# Patient Record
Sex: MALE | Race: WHITE | Employment: OTHER | URBAN - METROPOLITAN AREA
[De-identification: names, ages, dates, MRNs, and addresses within clinical notes are randomized per-mention and may not be internally consistent; named-entity substitution may affect disease eponyms.]

---

## 2017-02-21 PROBLEM — Z95.5 S/P CORONARY ARTERY STENT PLACEMENT: Status: ACTIVE | Noted: 2017-02-21

## 2017-03-21 PROBLEM — L60.0 INGROWN NAIL OF GREAT TOE OF LEFT FOOT: Status: ACTIVE | Noted: 2017-03-21

## 2017-09-22 PROBLEM — C61 PROSTATE CANCER (HCC): Status: ACTIVE | Noted: 2017-09-22

## 2017-09-22 PROBLEM — L60.0 INGROWN NAIL OF GREAT TOE OF LEFT FOOT: Status: RESOLVED | Noted: 2017-03-21 | Resolved: 2017-09-22

## 2018-01-09 PROCEDURE — 87086 URINE CULTURE/COLONY COUNT: CPT | Performed by: UROLOGY

## 2018-01-18 PROCEDURE — 88344 IMHCHEM/IMCYTCHM EA MLT ANTB: CPT | Performed by: UROLOGY

## 2018-01-18 PROCEDURE — 88305 TISSUE EXAM BY PATHOLOGIST: CPT | Performed by: UROLOGY

## 2018-08-20 PROCEDURE — 81015 MICROSCOPIC EXAM OF URINE: CPT | Performed by: UROLOGY

## 2018-12-04 PROCEDURE — 81015 MICROSCOPIC EXAM OF URINE: CPT | Performed by: UROLOGY

## 2019-05-03 PROBLEM — I10 ESSENTIAL HYPERTENSION: Status: ACTIVE | Noted: 2019-05-03

## 2019-08-13 PROCEDURE — 81015 MICROSCOPIC EXAM OF URINE: CPT | Performed by: UROLOGY

## 2019-08-13 PROCEDURE — 88108 CYTOPATH CONCENTRATE TECH: CPT | Performed by: UROLOGY

## 2020-10-23 PROBLEM — Z77.098 AGENT ORANGE EXPOSURE: Status: ACTIVE | Noted: 2020-10-23

## 2021-04-11 DIAGNOSIS — Z23 NEED FOR VACCINATION: ICD-10-CM

## 2024-03-29 ENCOUNTER — TELEPHONE (OUTPATIENT)
Dept: INTERNAL MEDICINE CLINIC | Age: 79
End: 2024-03-29

## 2024-03-29 DIAGNOSIS — S22.080S CLOSED WEDGE COMPRESSION FRACTURE OF T11 VERTEBRA, SEQUELA: Primary | ICD-10-CM

## 2024-03-29 RX ORDER — HYDROMORPHONE HYDROCHLORIDE 4 MG/1
1 TABLET ORAL
COMMUNITY
Start: 2024-03-28 | End: 2024-03-29

## 2024-03-29 RX ORDER — FENTANYL 12.5 UG/1
1 PATCH TRANSDERMAL
COMMUNITY
Start: 2024-03-29

## 2024-03-29 RX ORDER — HYDROMORPHONE HYDROCHLORIDE 4 MG/1
4 TABLET ORAL
Qty: 56 TABLET | Refills: 0 | Status: SHIPPED | OUTPATIENT
Start: 2024-03-29 | End: 2024-04-01

## 2024-03-29 NOTE — TELEPHONE ENCOUNTER
Patient is a new admission at the [facility]. Needs script for his hydromorphone tabs.     He has T11 fracture and low back pain along with MM.    Hospital chart reviewed and  reviewed.  MD saw patient today.    Script sent to [facility] for use at [facility].    Jacqui LÓPEZ

## 2024-04-01 ENCOUNTER — INITIAL APN SNF VISIT (OUTPATIENT)
Dept: INTERNAL MEDICINE CLINIC | Age: 79
End: 2024-04-01

## 2024-04-01 VITALS
DIASTOLIC BLOOD PRESSURE: 54 MMHG | BODY MASS INDEX: 27 KG/M2 | TEMPERATURE: 98 F | RESPIRATION RATE: 17 BRPM | SYSTOLIC BLOOD PRESSURE: 95 MMHG | OXYGEN SATURATION: 92 % | WEIGHT: 182.19 LBS | HEART RATE: 68 BPM

## 2024-04-01 DIAGNOSIS — S22.080S CLOSED WEDGE COMPRESSION FRACTURE OF T11 VERTEBRA, SEQUELA: ICD-10-CM

## 2024-04-01 RX ORDER — SENNOSIDES 8.6 MG
17.2 TABLET ORAL EVERY EVENING
COMMUNITY

## 2024-04-01 RX ORDER — ONDANSETRON 4 MG/1
4 TABLET, ORALLY DISINTEGRATING ORAL EVERY 8 HOURS PRN
COMMUNITY

## 2024-04-01 RX ORDER — LACTULOSE 10 G/15ML
10 SOLUTION ORAL 2 TIMES DAILY PRN
COMMUNITY

## 2024-04-01 RX ORDER — GABAPENTIN 100 MG/1
200 CAPSULE ORAL 3 TIMES DAILY
COMMUNITY

## 2024-04-01 RX ORDER — LIDOCAINE 50 MG/G
1 PATCH TOPICAL EVERY 24 HOURS
COMMUNITY

## 2024-04-01 RX ORDER — DOCUSATE SODIUM 100 MG/1
100 CAPSULE, LIQUID FILLED ORAL 2 TIMES DAILY
COMMUNITY

## 2024-04-01 RX ORDER — TIZANIDINE 4 MG/1
4 TABLET ORAL EVERY 8 HOURS PRN
COMMUNITY

## 2024-04-01 RX ORDER — HYDROMORPHONE HYDROCHLORIDE 4 MG/1
4 TABLET ORAL
Qty: 56 TABLET | Refills: 0 | Status: SHIPPED | OUTPATIENT
Start: 2024-04-01 | End: 2024-04-08

## 2024-04-01 NOTE — PROGRESS NOTES
Skilled Nursing Facility, Subacute Rehab  [facility]    Mann Brown Author: Jacqui Newsome, APRN     1945 MRN [id number]   Last Hospital  Admission        Last Hospital Discharge   PCP Billy Atkins MD       HPI:      Mann Brown is a 78 year old male with previous medical history including CAD, HTN, HLD, recent history of thoracic compression fracture T11 and kyphoplasty  3/11/24. He was admitted to the hospital for increasing back pain and found to have an acute T12 compression fracture on MRI. He had another kyphoplasty on 3/21/24. Pain management with IR and palliative care to follow. Fentanyl patch, dilaudid prn, gabapentin TID. Decadron burst completed 3/29/24. Had bone marrow biopsy pathology consistent with multiple myeloma, will need outpatient PET/CT and follow up with Dr Garcia. Now admitted to SNF for sub-acute rehabilitation.     Abdelrahman is seen in bed in his room today. His wife is at the bedside. He is having some improvement in pain post the second kyphoplasty. Pain is more when sitting upright, improves with lying down. He is sleeping well at night with gabapentin and dilaudid at night time. Then when he wakes in the morning he has a lot of pain when he gets out of bed. DW him, we can schedule a dose for 6 am and then he can get up after 7 with improved pain management. He is in agreement. He has been constipated. NO BM in several days. On senna at evening, add colace BID and lactulose Prn, suppository in the hospital with no results.   DW nursing. Med list to patient and family. He is taking aspirin 81 mg daily.     Hospital Discharge Diagnoses:  Acute T 12 compression fx with kyphoplasty  Recent T11 compression fx with kyphoplasty  Multiple Myeloma by bone marrow bx, new dx  CAD  HTN  HLD  Weakness  Pain  constipation      Chief Complaint at visit:   Chief Complaint   Patient presents with    Follow - Up     Low back pain, s/p kyphoplasty x2, bone marrow biopsy,  constipation        ALLERGIES    ALLERGIES:  Allergies   Allergen Reactions    Adhesive Tape RASH     blisters    Seasonal Runny nose       CURRENT MEDS:      CURRENT MEDICATIONS   Current Outpatient Medications   Medication Sig Dispense Refill    HYDROmorphone 4 MG Oral Tab Take 1 tablet (4 mg total) by mouth every 3 (three) hours as needed for Pain. And scheduled at 6am daily 56 tablet 0    gabapentin 100 MG Oral Cap Take 2 capsules (200 mg total) by mouth 3 (three) times daily.      lidocaine 5 % External Patch Place 1 patch onto the skin daily. On 12 hours off 12 hours      ondansetron 4 MG Oral Tablet Dispersible Take 1 tablet (4 mg total) by mouth every 8 (eight) hours as needed for Nausea.      sennosides 8.6 MG Oral Tab Take 2 tablets (17.2 mg total) by mouth every evening.      tiZANidine 4 MG Oral Tab Take 1 tablet (4 mg total) by mouth every 8 (eight) hours as needed (muscle spasm).      docusate sodium 100 MG Oral Cap Take 1 capsule (100 mg total) by mouth 2 (two) times daily.      lactulose 10 GM/15ML Oral Solution Take 15 mL (10 g total) by mouth 2 (two) times daily as needed (constipation).      fentaNYL 12 MCG/HR Transdermal Patch 72 Hr Place 1 patch onto the skin every 3 (three) days.      enalapril 10 MG Oral Tab Take 1 tablet (10 mg total) by mouth 2 (two) times daily. Patient needs appointment with PCP for future refills 180 tablet 1    ATORVASTATIN 10 MG Oral Tab TAKE 1 TABLET (10 MG TOTAL) BY MOUTH NIGHTLY. 90 tablet 3    latanoprost (XALATAN) 0.005 % Ophthalmic Solution 1 drop nightly.      CLARITIN 10 MG OR CAPS 1 CAPSULE DAILY during allergy season      ASPIRIN 81 MG OR TABS 1 TABLET DAILY      AMOXICILLIN 500 MG Oral Cap TAKE 4 CAPSULES 1 HOUR PRIOR TO DENTAL PROCEDURE (Patient not taking: Reported on 4/1/2024) 4 capsule 0    mupirocin 2 % External Ointment Apply 1 Application topically 2 (two) times daily. (Patient not taking: Reported on 4/1/2024) 30 g 2       HISTORY:    Past Medical  History:   Diagnosis Date    CORONARY ARTERY DISEASE     Coronary atherosclerosis of unspecified type of vessel, native or graft     Elevated prostate specific antigen (PSA)     Hx of diseases NEC     rashes, sensitive skin    HYPERLIPIDEMIA     HYPERTENSION     MARCELO (obstructive sleep apnea) DX 3/3/10; TX 4/10/10    AHI 25; SUPINE AHI 81; Erick 81%; CPAP=10 CWP;     Other and unspecified hyperlipidemia     Prostate cancer (HCC)     SEASONAL ALLERGIES     Spring & Summer    SLEEP APNEA 03/2010 DMG    AHI 25, desats 81%    Unspecified essential hypertension      Past Surgical History:   Procedure Laterality Date    CHOLECYSTECTOMY      COLONOSCOPY  2003    adenomatous colon polyp    COLONOSCOPY  2/2011    diverticulosis    CORONARY ART DIL,ONE VESSEL  2007    stent    OTHER SURGICAL HISTORY  01/18/2018    PNBx- Dr. Dunn    REMOVAL GALLBLADDER      SKIN SURGERY  02/03/2022    BCC sm -right medial canthus     VALVE REPAIR  10/09    Aortic valve replacement     Family History   Problem Relation Age of Onset    Arthritis Mother     Other (Other) Mother         alzheimer's     Cancer Father         Prostate cancer     Social History     Socioeconomic History    Marital status:    Occupational History    Occupation: -   Tobacco Use    Smoking status: Never    Smokeless tobacco: Never   Substance and Sexual Activity    Alcohol use: Yes     Comment: socially    Drug use: No   Other Topics Concern    Exercise Yes     Comment: walks 2 miles x 5 days, 3 days at [employer]         POA not on file    CODE STATUS:  Full Code    ADVANCED CARE PLANNING TEAM: None    SUBJECTIVE/ ROS:       REVIEW OF SYSTEMS:  GENERAL HEALTH:feels well otherwise  SKIN: denies any unusual skin lesions or rashes  WOUNDS: none   EYES:no visual complaints or deficits  HENT: denies nasal congestion, sinus pain or sore throat; and hearing loss negative  RESPIRATORY: denies shortness of breath, wheezing or cough    CARDIOVASCULAR:denies chest pain, no palpitations , denies cough  GI: denies nausea, vomiting,  diarrhea; no rectal bleeding; no heartburn + constipation, no abdominal pain  Gu: No urinary frequency, urgency or dysuria  MUSCULOSKELETAL:mid back pain  NEURO:no sensory or motor complaint  PSYCHE: no symptoms of depression or anxiety  HEMATOLOGY:denies bruising, denies excessive bleeding  ENDOCRINE: denies excessive thirst or urination; denies unexpected wt gain or wt loss  ALLERGY/IMM.: hx of allergies      OBJECTIVE:     ASSESSMENT/ PHYSICAL EXAM:     VITALS:  BP 95/54   Pulse 68   Temp 97.9 °F (36.6 °C)   Resp 17   Wt 182 lb 3.2 oz (82.6 kg)   SpO2 92%   BMI 26.91 kg/m²      PHYSICAL EXAM:  GENERAL HEALTH: well developed, well nourished, in no apparent distress  LINES, TUBES, DRAINS:  none  SKIN: pink, warm, dry  WOUND: none  EYES: sclera anicteric, conjunctiva normal; there is no nystagmus, no drainage from eyes  HENT: normocephalic; normal nose, no nasal drainage, mucous membranes pink, moist.  NECK: supple, non tender, FROM  BREAST: deferred  RESPIRATORY:clear, no crackles, no wheezing, no dyspnea, no cough, room air  CARDIOVASCULAR: RRR, S1 and S2, no murmurs, no edema  ABDOMEN:  normal active BS+, soft, nondistended; no organomegaly, no masses; nontender, no guarding, no rebound tenderness.  :no suprapubic distension  LYMPHATIC:no lymphedema  MUSCULOSKELETAL: no acute synovitis upper or lower extremity  EXTREMITIES/VASCULAR:radial pulses 2+ and dorsalis pedal pulses 2+  NEUROLOGIC: A&OX3, no focal deficits, follows commands, moves all extremities  PSYCHIATRIC: calm, cooperative, mood and affect appropriate to situation    Hospital and rehab lab results and imaging reviewed as available.    DIAGNOSTICS REVIEWED AT THIS VISIT:    Lab Results   Component Value Date    WBC 6.1 03/29/2024    RBC 3.76 (L) 03/29/2024    HGB 11.2 (L) 03/29/2024    HCT 32.9 (L) 03/29/2024    MCV 87.5 03/29/2024    MCH 29.8  03/29/2024    MCHC 34.0 03/29/2024    RDW 13.2 03/29/2024    .0 03/29/2024     Lab Results   Component Value Date    GLU 90 03/29/2024    BUN 31 (H) 03/29/2024    BUNCREA 23.0 (H) 09/09/2021    CREATSERUM 0.96 03/29/2024    ANIONGAP 5 03/29/2024    GFR >59 12/30/2010    CA 9.6 03/29/2024    OSMOCALC 292 03/29/2024    ALKPHO 86 03/29/2024    AST 17 03/29/2024    ALT 22 03/29/2024    BILT 0.4 03/29/2024    TP 5.5 (L) 03/29/2024    ALB 3.0 (L) 03/29/2024    GLOBULIN 2.5 (L) 03/29/2024    AGRATIO 2.5 10/10/2014     03/29/2024    K 4.8 03/29/2024     03/29/2024    CO2 27.0 03/29/2024     Lab Results   Component Value Date    MG 2.1 03/29/2024     Lab Results   Component Value Date    VITD 33.2 03/29/2024       [facility] medical records reviewed.  Medication reconciliation completed.        MEDICAL DECISION MAKING and PLAN OF CARE:       SEE PLAN BELOW    Back pain 2/2 Acute T12 compression fx s/p kyphoplasty   Recent T11 compression fracture s/p kyphoplasty 3/11/24  Concern for MM BMB +  Pain management with IR and palliative care consults inpt  Gabapenin 200 mg TID  Fentanyl patch 12 mcg Q72 hours, changed 3/29/24  Hydromorphone 4 mg Tab Q3 prn and scheduled in am at 6     Multiple Myeloma  Bone marrow biopsy pathology + MM  Needs outpatient PET and CT and follow up with Dr Garcia    CAD, HTN, HLD  VS q shift and prn  Aspirin 81 mg daily  Atorvastatin   Enalapril  Atorvastatin      Physical Deconditioning/Weakness; at risk for falling  Fall Precautions  [facility] team to establish care plan meeting with patient and POA/family as appropriate  KARUNA team/ & discharge planner to assist with establishing safe discharge plan for next level of care  PT/OT evaluate and treat  DC planning with MDT, SW, therapies  Services on DC: HHC RN/PT/OT/SW  Equipment on DC: Walker      Pain management  Monitor and assess pain  Gabapentin 200 mg TID  Fentanyl patch 12 mcg q 72 hours  Hydromorphone 4 mg Q3   prn  Offer to pre-medicate 30-60 min prior to therapy  Physiatry evaluation with management appreciated    Bowel management  Monitor for Bms  Constipated now  Adding colace 100 mg BID  On Senna 2 tabs qpm  Add lactulose 15 ml BID prn    Vitamins/supplements as r/t deficiencies  [facility] RD to follow while in rehab; supplementation/diet as per [facility] RD  May continue home supplements    At risk for malnutrition  Dietician consult  Weekly weights  Supplements as indicated  Encourage po intake    DVT Prophylaxis   Encourage exercise and participation with therapy as much as able      GI prophylaxis  none    Labs  CBC, CMP weekly and prn      Follow Ups:  PCP within 7 days of DC from rehab  Oncology Dr Garcia [phone]  Palliative care outpatient  [phone]  IR kyphoplasty follow up as needed        spent w/ patient and reviewing medical records, labs, completing medication reconciliation and entering orders to establish plan of care in [facility].      Note to patient: The 21st Century Cures Act makes medical notes like these available to patients in the interest of transparency. However, this is a medical document intended as peer to peer communication. It is written in medical language and may contain abbreviations or verbiage that are unfamiliar. It may appear blunt or direct. Medical documents are intended to carry relevant information, facts as evident, and the clinical opinion of the practitioner who signs the document.       Jacqui Newsome, APRN  04/01/24

## 2024-04-03 ENCOUNTER — SNF VISIT (OUTPATIENT)
Dept: INTERNAL MEDICINE CLINIC | Age: 79
End: 2024-04-03

## 2024-04-03 VITALS
BODY MASS INDEX: 27 KG/M2 | WEIGHT: 182 LBS | SYSTOLIC BLOOD PRESSURE: 118 MMHG | OXYGEN SATURATION: 95 % | DIASTOLIC BLOOD PRESSURE: 59 MMHG | HEART RATE: 63 BPM | TEMPERATURE: 98 F | RESPIRATION RATE: 16 BRPM

## 2024-04-03 DIAGNOSIS — R52 PAIN: ICD-10-CM

## 2024-04-03 DIAGNOSIS — K59.03 DRUG-INDUCED CONSTIPATION: ICD-10-CM

## 2024-04-03 DIAGNOSIS — I10 ESSENTIAL HYPERTENSION: ICD-10-CM

## 2024-04-03 DIAGNOSIS — S22.080S CLOSED WEDGE COMPRESSION FRACTURE OF T11 VERTEBRA, SEQUELA: Primary | ICD-10-CM

## 2024-04-03 DIAGNOSIS — C90.00 MULTIPLE MYELOMA NOT HAVING ACHIEVED REMISSION (HCC): ICD-10-CM

## 2024-04-03 PROCEDURE — 99309 SBSQ NF CARE MODERATE MDM 30: CPT | Performed by: NURSE PRACTITIONER

## 2024-04-03 RX ORDER — FENTANYL 25 UG/1
1 PATCH TRANSDERMAL
Qty: 10 PATCH | Refills: 0 | Status: SHIPPED | OUTPATIENT
Start: 2024-04-03 | End: 2024-05-03

## 2024-04-03 NOTE — PROGRESS NOTES
Mann Brown, 8/5/1945, 78 year old, male    Chief Complaint:    Chief Complaint   Patient presents with    Follow - Up     T11 and T12 compression fx s/p kyphoplasties. Multiple myeloma, pain, weakness        Subjective:   TODAY:  Abdelrahman is seen lying in bed this afternoon. Pain is improving a little. He is trying to take less dilaudid because of the constipation. Had small BM 3/31/24. He has an OK appetite. Feels the Dilaudid in the morning is helping. Still with increased pain at sitting and standing/walking.   Pain management gabapentin 200 mg TID  Fentanyl patch 12 mcg q 72 hours  Dilaudid 4 mg Q3 prn taking 5 per day, scheduled at 6 AM  Tizanidine 4 mg PO Q8 prn has take about three this week    DW him may increase fentanyl patch to 25 mcg daily with next change in attempt to improve baseline pain control and reduce ups and downs of the oral meds. He is in agreement    Bowel management  Colace 100 mg BID  Senna 2 tabs at PM  Lactulose 15 ml BID prn        Denies insomnia, fatigue, fever/chills, cough, SOB, dyspnea, angina, palpitations, n/v, diarrhea, ++constipation, and urinary sxs.      Objective:  /59   Pulse 63   Temp 97.7 °F (36.5 °C)   Resp 16   Wt 182 lb (82.6 kg)   SpO2 95%   BMI 26.88 kg/m²     PHYSICAL EXAM:  GENERAL HEALTH: well developed, well nourished, in no apparent distress  LINES, TUBES, DRAINS:  none  SKIN: pink, warm, dry  WOUND: none  EYES: sclera anicteric, conjunctiva normal; there is no nystagmus, no drainage from eyes  HENT: normocephalic; normal nose, no nasal drainage, mucous membranes pink, moist.  NECK: supple, non tender, FROM  BREAST: deferred  RESPIRATORY:clear, no crackles, no wheezing, no dyspnea, no cough, room air  CARDIOVASCULAR: RRR, S1 and S2, no murmurs, no edema  ABDOMEN:  normal active BS+, soft, nondistended; no organomegaly, no masses; nontender, no guarding, no rebound tenderness.  :no suprapubic distension  LYMPHATIC:no lymphedema  MUSCULOSKELETAL: no  acute synovitis upper or lower extremity  EXTREMITIES/VASCULAR:radial pulses 2+ and dorsalis pedal pulses 2+  NEUROLOGIC: A&OX3, no focal deficits, follows commands, moves all extremities  PSYCHIATRIC: calm, cooperative, mood and affect appropriate to situation    Therapy update:  Transfers: CGA  ADLS:  Mod assist dressing  Ambulation:  250 ft with RW and CGA  DC plan: home with spouse around 4/13/24    Medications reviewed: Yes      Current Outpatient Medications:     fentaNYL 25 MCG/HR Transdermal Patch 72 Hr, Place 1 patch onto the skin every third day., Disp: 10 patch, Rfl: 0    HYDROmorphone 4 MG Oral Tab, Take 1 tablet (4 mg total) by mouth every 3 (three) hours as needed for Pain. And scheduled at 6am daily, Disp: 56 tablet, Rfl: 0    gabapentin 100 MG Oral Cap, Take 2 capsules (200 mg total) by mouth 3 (three) times daily., Disp: , Rfl:     lidocaine 5 % External Patch, Place 1 patch onto the skin daily. On 12 hours off 12 hours, Disp: , Rfl:     ondansetron 4 MG Oral Tablet Dispersible, Take 1 tablet (4 mg total) by mouth every 8 (eight) hours as needed for Nausea., Disp: , Rfl:     sennosides 8.6 MG Oral Tab, Take 2 tablets (17.2 mg total) by mouth every evening., Disp: , Rfl:     tiZANidine 4 MG Oral Tab, Take 1 tablet (4 mg total) by mouth every 8 (eight) hours as needed (muscle spasm)., Disp: , Rfl:     docusate sodium 100 MG Oral Cap, Take 1 capsule (100 mg total) by mouth 2 (two) times daily., Disp: , Rfl:     lactulose 10 GM/15ML Oral Solution, Take 15 mL (10 g total) by mouth 2 (two) times daily as needed (constipation)., Disp: , Rfl:     AMOXICILLIN 500 MG Oral Cap, TAKE 4 CAPSULES 1 HOUR PRIOR TO DENTAL PROCEDURE (Patient not taking: Reported on 4/1/2024), Disp: 4 capsule, Rfl: 0    mupirocin 2 % External Ointment, Apply 1 Application topically 2 (two) times daily. (Patient not taking: Reported on 4/1/2024), Disp: 30 g, Rfl: 2    enalapril 10 MG Oral Tab, Take 1 tablet (10 mg total) by mouth 2  (two) times daily. Patient needs appointment with PCP for future refills, Disp: 180 tablet, Rfl: 1    ATORVASTATIN 10 MG Oral Tab, TAKE 1 TABLET (10 MG TOTAL) BY MOUTH NIGHTLY., Disp: 90 tablet, Rfl: 3    latanoprost (XALATAN) 0.005 % Ophthalmic Solution, 1 drop nightly., Disp: , Rfl:     CLARITIN 10 MG OR CAPS, 1 CAPSULE DAILY during allergy season, Disp: , Rfl:     ASPIRIN 81 MG OR TABS, 1 TABLET DAILY, Disp: , Rfl:       Diagnostics reviewed:    Lab Results   Component Value Date    WBC 6.1 03/29/2024    RBC 3.76 (L) 03/29/2024    HGB 11.2 (L) 03/29/2024    HCT 32.9 (L) 03/29/2024    MCV 87.5 03/29/2024    MCH 29.8 03/29/2024    MCHC 34.0 03/29/2024    RDW 13.2 03/29/2024    .0 03/29/2024     Lab Results   Component Value Date    GLU 90 03/29/2024    BUN 31 (H) 03/29/2024    BUNCREA 23.0 (H) 09/09/2021    CREATSERUM 0.96 03/29/2024    ANIONGAP 5 03/29/2024    GFR >59 12/30/2010    CA 9.6 03/29/2024    OSMOCALC 292 03/29/2024    ALKPHO 86 03/29/2024    AST 17 03/29/2024    ALT 22 03/29/2024    BILT 0.4 03/29/2024    TP 5.5 (L) 03/29/2024    ALB 3.0 (L) 03/29/2024    GLOBULIN 2.5 (L) 03/29/2024    AGRATIO 2.5 10/10/2014     03/29/2024    K 4.8 03/29/2024     03/29/2024    CO2 27.0 03/29/2024       Assessment and plan:    Back pain 2/2 Acute T12 compression fx s/p kyphoplasty   Recent T11 compression fracture s/p kyphoplasty 3/11/24  Concern for MM BMB +  Pain management with IR and palliative care consults inpt  Gabapenin 200 mg TID  Fentanyl patch 12 mcg Q72 hours, changed 3/29/24, increase with tomorrows change to 25 mcg q 72 hours  Hydromorphone 4 mg Tab Q3 prn and scheduled in am at 6   Tizanidine 4mg PO Q8 prn     Multiple Myeloma  Bone marrow biopsy pathology + MM  Needs outpatient PET and CT and follow up with Dr Garcia     CAD, HTN, HLD  VS q shift and prn  Aspirin 81 mg daily  Atorvastatin  10 mg nightly  Enalapril 10 mg BID      Physical Deconditioning/Weakness; at risk for falling  Fall  Precautions  K[facility]UNA team to establish care plan meeting with patient and POA/family as appropriate  KARUNA team/ & discharge planner to assist with establishing safe discharge plan for next level of care  PT/OT evaluate and treat  DC planning with MDT, SW, therapies around 4/13/24  Services on DC: C RN/PT/OT/SW  Equipment on DC: Walker        Pain management  Monitor and assess pain  Gabapentin 200 mg TID  Fentanyl patch 12 mcg increase to 25 mcg q 72 hours  Hydromorphone 4 mg Q3  prn  Offer to pre-medicate 30-60 min prior to therapy  Physiatry evaluation with management appreciated     Bowel management  Monitor for Bms  Constipated now bowel sounds present and non tender abdomen, passing flatus  Colace 100 mg BID  On Senna 2 tabs qpm change to BID  Lactulose 15 ml BID prn  Dulcolax supp daily prn if lactulose not effective     Vitamins/supplements as r/t deficiencies  KARU[facility] RD to follow while in rehab; supplementation/diet as per [facility] RD  May continue home supplements     At risk for malnutrition  Dietician consult  Weekly weights  Supplements as indicated  Encourage po intake     DVT Prophylaxis   Encourage exercise and participation with therapy as much as able     GI prophylaxis  none     Labs  CBC, CMP weekly and prn  Next 4/4/24    Follow Ups:  PCP within 7 days of DC from rehab  Oncology Dr Garcia [phone]  Palliative care outpatient  [phone]  IR kyphoplasty follow up as needed        *Established patient; follow-up moderately complex visit/ greater than 30       38 minutes spent w/ patient and staff, including but not limited to/ reviewing present status, needs, abilities with disciplines, reviewing medical records, vital signs, labs, completing medication reconciliation and entering orders for continued care in [facility].    Note to patient: The 21st Century Cures Act makes medical notes like these available to patients in the interest of transparency. However, this is a medical document intended as  peer to peer communication. It is written in medical language and may contain abbreviations or verbiage that are unfamiliar. It may appear blunt or direct. Medical documents are intended to carry relevant information, facts as evident, and the clinical opinion of the practitioner who signs the document.    Jacqui Newsome, APRN  4/3/2024

## 2024-04-09 ENCOUNTER — SNF VISIT (OUTPATIENT)
Dept: INTERNAL MEDICINE CLINIC | Age: 79
End: 2024-04-09

## 2024-04-09 DIAGNOSIS — D50.9 IRON DEFICIENCY ANEMIA, UNSPECIFIED IRON DEFICIENCY ANEMIA TYPE: ICD-10-CM

## 2024-04-09 DIAGNOSIS — S22.080S CLOSED WEDGE COMPRESSION FRACTURE OF T11 VERTEBRA, SEQUELA: Primary | ICD-10-CM

## 2024-04-09 DIAGNOSIS — K59.03 DRUG-INDUCED CONSTIPATION: ICD-10-CM

## 2024-04-09 DIAGNOSIS — C90.00 MULTIPLE MYELOMA NOT HAVING ACHIEVED REMISSION (HCC): ICD-10-CM

## 2024-04-09 DIAGNOSIS — R52 PAIN: ICD-10-CM

## 2024-04-09 DIAGNOSIS — I10 ESSENTIAL HYPERTENSION: ICD-10-CM

## 2024-04-09 DIAGNOSIS — Z78.9 DECREASED ACTIVITIES OF DAILY LIVING (ADL): ICD-10-CM

## 2024-04-09 DIAGNOSIS — R53.1 WEAKNESS: ICD-10-CM

## 2024-04-09 PROCEDURE — 99309 SBSQ NF CARE MODERATE MDM 30: CPT | Performed by: NURSE PRACTITIONER

## 2024-04-10 VITALS
WEIGHT: 182 LBS | DIASTOLIC BLOOD PRESSURE: 54 MMHG | TEMPERATURE: 97 F | HEART RATE: 74 BPM | SYSTOLIC BLOOD PRESSURE: 118 MMHG | OXYGEN SATURATION: 97 % | RESPIRATION RATE: 16 BRPM | BODY MASS INDEX: 27 KG/M2

## 2024-04-10 NOTE — PROGRESS NOTES
Mann Brown, 8/5/1945, 78 year old, male    Chief Complaint:    Chief Complaint   Patient presents with    Follow - Up     T11 and T12 fractures, multiple Myeloma, pain, ADL dysfunction, constipation, anemia        Subjective:   TODAY:  Abdelrahman is seen lying in bed this afternoon. Pain is improving a little. He is trying to take less dilaudid because of the constipation. Having small BMs. He has an OK appetite. Feels the increase in Fentanyl patch dose is improving and taking less dilaudid maybe 3-4 a day now. Still with increased pain at sitting and standing/walking.   Pain management gabapentin 200 mg TID  Fentanyl patch 25 mcg q 72 hours  Dilaudid 4 mg Q3 prn taking 3-4 per day, scheduled at 6 AM  Tizanidine 4 mg PO Q8 prn     Bowel management  Colace 100 mg BID  Senna 2 tabs at PM  Lactulose 15 ml BID prn    Also DW he and wife anemia, likely related to Myeloma, checking stool for Occult blood, adding iron tabs for now, will need increase in bowel management.     He sees oncology on 4/11, will appreciate input.   They had asked for appeal and extension to DC planning for 4/13, will follow up on Thursday with them. DW them DC planning, home care, meds follow ups.    Denies insomnia, fatigue, fever/chills, cough, SOB, dyspnea, angina, palpitations, n/v, diarrhea, +constipation, and urinary sxs.      Objective:  /54   Pulse 74   Temp 97.3 °F (36.3 °C)   Resp 16   Wt 182 lb (82.6 kg)   SpO2 97%   BMI 26.88 kg/m²     PHYSICAL EXAM:  GENERAL HEALTH: well developed, well nourished, in no apparent distress  LINES, TUBES, DRAINS:  none  SKIN: pink, warm, dry  WOUND: none  EYES: sclera anicteric, conjunctiva normal; there is no nystagmus, no drainage from eyes  HENT: normocephalic; normal nose, no nasal drainage, mucous membranes pink, moist.  NECK: supple, non tender, FROM  BREAST: deferred  RESPIRATORY:clear, no crackles, no wheezing, no dyspnea, no cough, room air  CARDIOVASCULAR: RRR, S1 and S2, no  murmurs, no edema  ABDOMEN:  normal active BS+, soft, nondistended; no organomegaly, no masses; nontender, no guarding, no rebound tenderness.  :no suprapubic distension  LYMPHATIC:no lymphedema  MUSCULOSKELETAL: no acute synovitis upper or lower extremity  EXTREMITIES/VASCULAR:radial pulses 2+ and dorsalis pedal pulses 2+  NEUROLOGIC: A&OX3, no focal deficits, follows commands, moves all extremities  PSYCHIATRIC: calm, cooperative, mood and affect appropriate to situation    Therapy update:  Transfers: CGA  ADLS:  Min assist dressing  Ambulation:  100-200 ft with RW and SBA  DC plan: home with spouse around 4/13/24 appeal pending now    Medications reviewed: Yes      Current Outpatient Medications:     fentaNYL 25 MCG/HR Transdermal Patch 72 Hr, Place 1 patch onto the skin every third day., Disp: 10 patch, Rfl: 0    gabapentin 100 MG Oral Cap, Take 2 capsules (200 mg total) by mouth 3 (three) times daily., Disp: , Rfl:     lidocaine 5 % External Patch, Place 1 patch onto the skin daily. On 12 hours off 12 hours, Disp: , Rfl:     ondansetron 4 MG Oral Tablet Dispersible, Take 1 tablet (4 mg total) by mouth every 8 (eight) hours as needed for Nausea., Disp: , Rfl:     sennosides 8.6 MG Oral Tab, Take 2 tablets (17.2 mg total) by mouth every evening., Disp: , Rfl:     tiZANidine 4 MG Oral Tab, Take 1 tablet (4 mg total) by mouth every 8 (eight) hours as needed (muscle spasm)., Disp: , Rfl:     docusate sodium 100 MG Oral Cap, Take 1 capsule (100 mg total) by mouth 2 (two) times daily., Disp: , Rfl:     lactulose 10 GM/15ML Oral Solution, Take 15 mL (10 g total) by mouth 2 (two) times daily as needed (constipation)., Disp: , Rfl:     AMOXICILLIN 500 MG Oral Cap, TAKE 4 CAPSULES 1 HOUR PRIOR TO DENTAL PROCEDURE (Patient not taking: Reported on 4/1/2024), Disp: 4 capsule, Rfl: 0    mupirocin 2 % External Ointment, Apply 1 Application topically 2 (two) times daily. (Patient not taking: Reported on 4/1/2024), Disp: 30 g,  Rfl: 2    enalapril 10 MG Oral Tab, Take 1 tablet (10 mg total) by mouth 2 (two) times daily. Patient needs appointment with PCP for future refills, Disp: 180 tablet, Rfl: 1    ATORVASTATIN 10 MG Oral Tab, TAKE 1 TABLET (10 MG TOTAL) BY MOUTH NIGHTLY., Disp: 90 tablet, Rfl: 3    latanoprost (XALATAN) 0.005 % Ophthalmic Solution, 1 drop nightly., Disp: , Rfl:     CLARITIN 10 MG OR CAPS, 1 CAPSULE DAILY during allergy season, Disp: , Rfl:     ASPIRIN 81 MG OR TABS, 1 TABLET DAILY, Disp: , Rfl:       Diagnostics reviewed:    Lab Results   Component Value Date    WBC 6.4 04/08/2024    RBC 3.37 (L) 04/08/2024    HGB 9.8 (L) 04/08/2024    HCT 30.1 (L) 04/08/2024    MCV 89.3 04/08/2024    MCH 29.1 04/08/2024    MCHC 32.6 04/08/2024    RDW 13.6 04/08/2024    .0 (L) 04/08/2024     Lab Results   Component Value Date    GLU 87 04/08/2024    BUN 21 04/08/2024    BUNCREA 23.0 (H) 09/09/2021    CREATSERUM 0.87 04/08/2024    ANIONGAP 3 04/08/2024    GFR >59 12/30/2010    CA 9.1 04/08/2024    OSMOCALC 288 04/08/2024    ALKPHO 86 03/29/2024    AST 17 03/29/2024    ALT 22 03/29/2024    BILT 0.4 03/29/2024    TP 5.5 (L) 03/29/2024    ALB 3.0 (L) 03/29/2024    GLOBULIN 2.5 (L) 03/29/2024    AGRATIO 2.5 10/10/2014     04/08/2024    K 4.5 04/08/2024     04/08/2024    CO2 28.0 04/08/2024       Assess  Component      Latest Ref Rng 4/8/2024   Iron, Serum      65 - 175 ug/dL 51 (L)    Transferrin      200 - 360 mg/dL 149 (L)    Iron Bind.Cap.(TIBC)      240 - 450 ug/dL 222 (L)    Iron Saturation      20 - 50 % 23    FERRITIN      30.0 - 530.0 ng/mL 374.6    Vitamin B12      193 - 986 pg/mL 404    FOLATE (FOLIC ACID), SERUM      >=8.7 ng/mL 9.7       Legend:  (L) Lowment and plan:    Back pain 2/2 Acute T12 compression fx s/p kyphoplasty   Recent T11 compression fracture s/p kyphoplasty 3/11/24  Concern for MM BMB +  Pain management with IR and palliative care consults inpt  Gabapenin 200 mg TID  Fentanyl patch 25 mcg q 72  hours  Hydromorphone 4 mg Tab Q3 prn and scheduled in am at 6   Tizanidine 4mg PO Q8 prn     Multiple Myeloma  Bone marrow biopsy pathology + MM  Needs outpatient PET and CT and follow up with Dr Garcia  Pet scan completed, follow up with Dr Garcia on 4/11/24  Also with anemia, likely related, check stool for OB anyway and add iron for now     CAD, HTN, HLD  VS q shift and prn  Aspirin 81 mg daily  Atorvastatin  10 mg nightly  Enalapril 10 mg BID   BP controlled     Physical Deconditioning/Weakness; at risk for falling  Fall Precautions  KARUNA team to establish care plan meeting with patient and POA/family as appropriate  KARUNA team/ & discharge planner to assist with establishing safe discharge plan for next level of care  PT/OT evaluate and treat  DC planning with MDT, JEFERSON, therapies around 4/13/24 appeal pending now  Services on DC: C RN/PT/OT/SW  Equipment on DC: Walker     Anemia   Iron defic  Add iron now  Check stool for OB  Likely related to MM  Await oncology input as well  Repeat Labs    Pain management  Monitor and assess pain  Gabapentin 200 mg TID  Fentanyl patch 25 mcg q 72 hours  Hydromorphone 4 mg Q3  prn  Offer to pre-medicate 30-60 min prior to therapy  Physiatry evaluation with management appreciated     Bowel management  Monitor for Bms  Constipated now bowel sounds present and non tender abdomen, passing flatus  Colace 100 mg BID  On Senna 2 tabs BID  Lactulose 15 ml BID prn  Dulcolax supp daily prn if lactulose not effective     Vitamins/supplements as r/t deficiencies  KARUNA RD to follow while in rehab; supplementation/diet as per KARUNA RD  May continue home supplements     At risk for malnutrition  Dietician consult  Weekly weights  Supplements as indicated  Encourage po intake     DVT Prophylaxis   Encourage exercise and participation with therapy as much as able     GI prophylaxis  none     Labs  CBC, CMP weekly and prn    Follow Ups:  PCP within 7 days of DC from rehab  Oncology Dr Garcia  [phone] 4/11/24  Palliative care outpatient  [phone]  IR kyphoplasty follow up as needed        *Established patient; follow-up moderately complex visit/ greater than 30       36 minutes spent w/ patient and staff, including but not limited to/ reviewing present status, needs, abilities with disciplines, reviewing medical records, vital signs, labs, completing medication reconciliation and entering orders for continued care in [facility].    Note to patient: The 21st Century Cures Act makes medical notes like these available to patients in the interest of transparency. However, this is a medical document intended as peer to peer communication. It is written in medical language and may contain abbreviations or verbiage that are unfamiliar. It may appear blunt or direct. Medical documents are intended to carry relevant information, facts as evident, and the clinical opinion of the practitioner who signs the document.    Jacqui Newsome, APRN  4/9/2024

## 2024-04-11 ENCOUNTER — SNF DISCHARGE (OUTPATIENT)
Dept: INTERNAL MEDICINE CLINIC | Age: 79
End: 2024-04-11

## 2024-04-11 DIAGNOSIS — D50.9 IRON DEFICIENCY ANEMIA, UNSPECIFIED IRON DEFICIENCY ANEMIA TYPE: ICD-10-CM

## 2024-04-11 DIAGNOSIS — S22.080S CLOSED WEDGE COMPRESSION FRACTURE OF T11 VERTEBRA, SEQUELA: Primary | ICD-10-CM

## 2024-04-11 DIAGNOSIS — I10 ESSENTIAL HYPERTENSION: ICD-10-CM

## 2024-04-11 DIAGNOSIS — Z78.9 DECREASED ACTIVITIES OF DAILY LIVING (ADL): ICD-10-CM

## 2024-04-11 DIAGNOSIS — R53.1 WEAKNESS: ICD-10-CM

## 2024-04-11 DIAGNOSIS — R19.5 HEME POSITIVE STOOL: ICD-10-CM

## 2024-04-11 DIAGNOSIS — R52 PAIN: ICD-10-CM

## 2024-04-11 DIAGNOSIS — K59.03 DRUG-INDUCED CONSTIPATION: ICD-10-CM

## 2024-04-11 DIAGNOSIS — C85.90 LYMPHOMA, UNSPECIFIED BODY REGION, UNSPECIFIED LYMPHOMA TYPE (HCC): ICD-10-CM

## 2024-04-11 DIAGNOSIS — C90.00 MULTIPLE MYELOMA NOT HAVING ACHIEVED REMISSION (HCC): ICD-10-CM

## 2024-04-11 PROCEDURE — 99316 NF DSCHRG MGMT 30 MIN+: CPT | Performed by: NURSE PRACTITIONER

## 2024-04-12 VITALS
BODY MASS INDEX: 26 KG/M2 | WEIGHT: 176.63 LBS | SYSTOLIC BLOOD PRESSURE: 140 MMHG | OXYGEN SATURATION: 95 % | DIASTOLIC BLOOD PRESSURE: 78 MMHG | RESPIRATION RATE: 18 BRPM | TEMPERATURE: 98 F | HEART RATE: 87 BPM

## 2024-04-12 NOTE — PROGRESS NOTES
Mann Brown, 8/5/1945, 78 year old, male is being discharged from Facility: [facility]      DISCHARGE SUMMARY    Date of Admission:  3/28/24    Date of Discharge: 4/13/24                                 Admitting Diagnoses:  Acute T 12 compression fx with kyphoplasty  Recent T11 compression fx with kyphoplasty  Multiple Myeloma by bone marrow bx, new dx  CAD  HTN  HLD  Weakness  Pain  constipation       Reason for Admission:  KARUNA and Medical Management    Subjective:  Abdelrahman is seen in bed today. His wife is at the bedside. Therapy is setting up to work with him. He still has pain, fentanyl patch is helping him take less Dilaudid. Bowels still slow. Using colace, senna and lactulose PRN. DW them heme pos stool. 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 DW him, need follow up with PCP to determine if GI and scope would be needed. He will follow up. They saw oncology today, multiple myeloma and lymphoma noted. Will see specialist at [place] for treatment plans. Going home Saturday and will need PCP follow up as well.     Vital signs:  /78   Pulse 87   Temp 97.9 °F (36.6 °C)   Resp 18   Wt 176 lb 9.6 oz (80.1 kg)   SpO2 95%   BMI 26.08 kg/m²     ROS and Physical Exam:      REVIEW OF SYSTEMS:  GENERAL HEALTH:feels well otherwise  SKIN: denies any unusual skin lesions or rashes  WOUNDS: none   EYES:no visual complaints or deficits  HENT: denies nasal congestion, sinus pain or sore throat; and hearing loss negative  RESPIRATORY: denies shortness of breath, wheezing or cough   CARDIOVASCULAR:denies chest pain, no palpitations , denies cough  GI: denies nausea, vomiting,  diarrhea; no rectal bleeding; no heartburn + constipation, no abdominal pain  Gu: No urinary frequency, urgency or dysuria  MUSCULOSKELETAL:mid back pain  NEURO:no sensory or motor complaint  PSYCHE: no symptoms of depression or anxiety  HEMATOLOGY:denies bruising, denies excessive bleeding  ENDOCRINE: denies  excessive thirst or urination; denies unexpected wt gain or wt loss  ALLERGY/IMM.: hx of allergies     PHYSICAL EXAM:  GENERAL HEALTH: well developed, well nourished, in no apparent distress  LINES, TUBES, DRAINS:  none  SKIN: pink, warm, dry  WOUND: none  EYES: sclera anicteric, conjunctiva normal; there is no nystagmus, no drainage from eyes  HENT: normocephalic; normal nose, no nasal drainage, mucous membranes pink, moist.  NECK: supple, non tender, FROM  BREAST: deferred  RESPIRATORY:clear, no crackles, no wheezing, no dyspnea, no cough, room air  CARDIOVASCULAR: RRR, S1 and S2, no murmurs, no edema  ABDOMEN:  normal active BS+, softly distended; no organomegaly, no masses; nontender, no guarding, no rebound tenderness.  :no suprapubic distension  LYMPHATIC:no lymphedema  MUSCULOSKELETAL: no acute synovitis upper or lower extremity  EXTREMITIES/VASCULAR:radial pulses 2+ and dorsalis pedal pulses 2+  NEUROLOGIC: A&OX3, no focal deficits, follows commands, moves all extremities  PSYCHIATRIC: calm, cooperative, mood and affect appropriate to situation     Therapy update:  Transfers: CGA  ADLS:  Min assist dressing  Ambulation:  100-200 ft with RW and SBA  DC plan: home with spouse around 4/13/24 appeal lost    Skilled Nursing Facility Course:    Abdelrahman has done well and is progressing well with PT/OT.  Medically cleared for anticipated discharge  IL  checked and OK for narcotics prescribed.  Home with home care services RN/PT/OT/Bath Aide  Equipment per PT recommendations: WC    Most recent lab results:  Lab Results   Component Value Date    WBC 6.4 04/08/2024    RBC 3.37 (L) 04/08/2024    HGB 9.8 (L) 04/08/2024    HCT 30.1 (L) 04/08/2024    MCV 89.3 04/08/2024    MCH 29.1 04/08/2024    MCHC 32.6 04/08/2024    RDW 13.6 04/08/2024    .0 (L) 04/08/2024     Lab Results   Component Value Date    GLU 87 04/08/2024    BUN 21 04/08/2024    BUNCREA 23.0 (H) 09/09/2021    CREATSERUM 0.87 04/08/2024    ANIONGAP 3  04/08/2024    GFR >59 12/30/2010    CA 9.1 04/08/2024    OSMOCALC 288 04/08/2024    ALKPHO 86 03/29/2024    AST 17 03/29/2024    ALT 22 03/29/2024    BILT 0.4 03/29/2024    TP 5.5 (L) 03/29/2024    ALB 3.0 (L) 03/29/2024    GLOBULIN 2.5 (L) 03/29/2024    AGRATIO 2.5 10/10/2014     04/08/2024    K 4.5 04/08/2024     04/08/2024    CO2 28.0 04/08/2024     Component      Latest Ref Rng 4/8/2024   Iron, Serum      65 - 175 ug/dL 51 (L)    Transferrin      200 - 360 mg/dL 149 (L)    Iron Bind.Cap.(TIBC)      240 - 450 ug/dL 222 (L)    Iron Saturation      20 - 50 % 23    FERRITIN      30.0 - 530.0 ng/mL 374.6    Vitamin B12      193 - 986 pg/mL 404    FOLATE (FOLIC ACID), SERUM      >=8.7 ng/mL 9.7       Legend:  (L) Low  Collected 4/10/2024  5:30 PM       Status: Final result       Dx: Anemia, unspecified    Specimen Information: Stool   0 Result Notes      Component  Ref Range & Units    Occult Blood Result  Negative for Occult Blood Positive for Occult Blood Abnormal    Resulting [facility]              Specimen Collected: 04/10/24  5:30 PM Last Resulted: 04/11/24 10:37 AM                 Discharge Diagnoses w/ current management:    Back pain 2/2 Acute T12 compression fx s/p kyphoplasty   Recent T11 compression fracture s/p kyphoplasty 3/11/24  Concern for MM BMB +  Pain management with IR and palliative care consults inpt  Gabapenin 200 mg TID  Fentanyl patch 25 mcg q 72 hours  Hydromorphone 4 mg Tab Q3 prn and scheduled in am at 6   Tizanidine 4mg PO Q8 prn     Multiple Myeloma  Bone marrow biopsy pathology + MM  Needs outpatient PET and CT and follow up with Dr Garcia  Pet scan completed, now concern for lymphoma  Follow up with Dr Garcia and [facility] specilaty team as directed  Also with anemia, likely related, check stool for OB anyway and add iron for now    Lymphoma   Follow up with oncology as outpatinet     CAD, HTN, HLD  VS q shift and prn  Aspirin 81 mg daily  Atorvastatin  10 mg  nightly  Enalapril 10 mg BID   BP controlled     Physical Deconditioning/Weakness; at risk for falling  Fall Precautions  KARUNA team to establish care plan meeting with patient and POA/family as appropriate  KARUNA team/ & discharge planner to assist with establishing safe discharge plan for next level of care  PT/OT evaluate and treat  DC planning with MDT, JEFERSON, therapies around 4/13/24 appeal lost  Services on DC: C RN/PT/OT/JEFERSON  Equipment on DC: Walker     Anemia   Iron defic  Add iron now  Check stool for OB + , follow up with PCP for further testing  Patient states hx of with radiation proctitis  Follow up with PCP for further workup including if GI eval is needed  Repeat Labs  Iron low, B12, folate wnl, ferritin wnl, on iron now     Pain management  Monitor and assess pain  Gabapentin 200 mg TID  Fentanyl patch 25 mcg q 72 hours  Hydromorphone 4 mg Q3  prn  Offer to pre-medicate 30-60 min prior to therapy  Physiatry evaluation with management appreciated     Bowel management  Monitor for Bms  Constipated now bowel sounds present and non tender abdomen, passing flatus  Colace 100 mg BID  On Senna 2 tabs BID  Lactulose 15 ml BID prn  Dulcolax supp daily prn if lactulose not effective     Vitamins/supplements as r/t deficiencies  KARUNA RD to follow while in rehab; supplementation/diet as per KARUNA RD  May continue home supplements     At risk for malnutrition  Dietician consult  Weekly weights  Supplements as indicated  Encourage po intake     DVT Prophylaxis   Encourage exercise and participation with therapy as much as able     GI prophylaxis  none     Labs  CBC, CMP weekly and prn     Follow Ups:  PCP within 7 days of DC from rehab for Heme pos stool and further recommendations including GI workup  Oncology Dr Garcia [phone] as directed  Palliative care outpatient  [phone]  IR kyphoplasty follow up as needed     Medication Reconciliation Completed:  Yes      spent in coordination of care in  preparation for discharge.    Note to patient: The 21st Century Cures Act makes medical notes like these available to patients in the interest of transparency. However, this is a medical document intended as peer to peer communication. It is written in medical language and may contain abbreviations or verbiage that are unfamiliar. It may appear blunt or direct. Medical documents are intended to carry relevant information, facts as evident, and the clinical opinion of the practitioner who signs the document.      Jacqui Newsome, RENE  4/11/2024  3 PM

## 2024-04-24 ENCOUNTER — ANESTHESIA EVENT (OUTPATIENT)
Dept: ENDOSCOPY | Facility: HOSPITAL | Age: 79
End: 2024-04-24
Payer: MEDICARE

## 2024-04-29 ENCOUNTER — HOSPITAL ENCOUNTER (OUTPATIENT)
Facility: HOSPITAL | Age: 79
Setting detail: HOSPITAL OUTPATIENT SURGERY
Discharge: HOME | End: 2024-04-29
Attending: INTERNAL MEDICINE | Admitting: INTERNAL MEDICINE
Payer: MEDICARE

## 2024-04-29 ENCOUNTER — ANESTHESIA (OUTPATIENT)
Dept: ENDOSCOPY | Facility: HOSPITAL | Age: 79
End: 2024-04-29
Payer: MEDICARE

## 2024-04-29 VITALS
HEIGHT: 69 IN | RESPIRATION RATE: 16 BRPM | BODY MASS INDEX: 26.07 KG/M2 | SYSTOLIC BLOOD PRESSURE: 115 MMHG | OXYGEN SATURATION: 93 % | WEIGHT: 176 LBS | HEART RATE: 79 BPM | DIASTOLIC BLOOD PRESSURE: 57 MMHG | TEMPERATURE: 97 F

## 2024-04-29 PROCEDURE — 88173 CYTOPATH EVAL FNA REPORT: CPT | Performed by: INTERNAL MEDICINE

## 2024-04-29 PROCEDURE — 88185 FLOWCYTOMETRY/TC ADD-ON: CPT | Performed by: INTERNAL MEDICINE

## 2024-04-29 PROCEDURE — 88172 CYTP DX EVAL FNA 1ST EA SITE: CPT | Performed by: INTERNAL MEDICINE

## 2024-04-29 PROCEDURE — 88305 TISSUE EXAM BY PATHOLOGIST: CPT | Performed by: INTERNAL MEDICINE

## 2024-04-29 PROCEDURE — 88177 CYTP FNA EVAL EA ADDL: CPT | Performed by: INTERNAL MEDICINE

## 2024-04-29 PROCEDURE — 88184 FLOWCYTOMETRY/ TC 1 MARKER: CPT | Performed by: INTERNAL MEDICINE

## 2024-04-29 PROCEDURE — 07D78ZX EXTRACTION OF THORAX LYMPHATIC, VIA NATURAL OR ARTIFICIAL OPENING ENDOSCOPIC, DIAGNOSTIC: ICD-10-PCS | Performed by: INTERNAL MEDICINE

## 2024-04-29 RX ORDER — SODIUM CHLORIDE, SODIUM LACTATE, POTASSIUM CHLORIDE, CALCIUM CHLORIDE 600; 310; 30; 20 MG/100ML; MG/100ML; MG/100ML; MG/100ML
INJECTION, SOLUTION INTRAVENOUS CONTINUOUS
Status: DISCONTINUED | OUTPATIENT
Start: 2024-04-29 | End: 2024-04-29

## 2024-04-29 RX ORDER — HYDROCODONE BITARTRATE AND ACETAMINOPHEN 5; 325 MG/1; MG/1
1 TABLET ORAL ONCE AS NEEDED
Status: DISCONTINUED | OUTPATIENT
Start: 2024-04-29 | End: 2024-04-29 | Stop reason: HOSPADM

## 2024-04-29 RX ORDER — SODIUM CHLORIDE, SODIUM LACTATE, POTASSIUM CHLORIDE, CALCIUM CHLORIDE 600; 310; 30; 20 MG/100ML; MG/100ML; MG/100ML; MG/100ML
INJECTION, SOLUTION INTRAVENOUS CONTINUOUS
Status: DISCONTINUED | OUTPATIENT
Start: 2024-04-29 | End: 2024-04-29 | Stop reason: HOSPADM

## 2024-04-29 RX ORDER — HYDROMORPHONE HYDROCHLORIDE 1 MG/ML
0.2 INJECTION, SOLUTION INTRAMUSCULAR; INTRAVENOUS; SUBCUTANEOUS EVERY 5 MIN PRN
Status: DISCONTINUED | OUTPATIENT
Start: 2024-04-29 | End: 2024-04-29 | Stop reason: HOSPADM

## 2024-04-29 RX ORDER — LIDOCAINE HYDROCHLORIDE 10 MG/ML
INJECTION, SOLUTION EPIDURAL; INFILTRATION; INTRACAUDAL; PERINEURAL AS NEEDED
Status: DISCONTINUED | OUTPATIENT
Start: 2024-04-29 | End: 2024-04-29 | Stop reason: SURG

## 2024-04-29 RX ORDER — DEXAMETHASONE SODIUM PHOSPHATE 4 MG/ML
VIAL (ML) INJECTION AS NEEDED
Status: DISCONTINUED | OUTPATIENT
Start: 2024-04-29 | End: 2024-04-29 | Stop reason: SURG

## 2024-04-29 RX ORDER — DIPHENHYDRAMINE HYDROCHLORIDE 50 MG/ML
12.5 INJECTION INTRAMUSCULAR; INTRAVENOUS AS NEEDED
Status: DISCONTINUED | OUTPATIENT
Start: 2024-04-29 | End: 2024-04-29 | Stop reason: HOSPADM

## 2024-04-29 RX ORDER — DEXTROSE MONOHYDRATE 25 G/50ML
50 INJECTION, SOLUTION INTRAVENOUS
Status: DISCONTINUED | OUTPATIENT
Start: 2024-04-29 | End: 2024-04-29 | Stop reason: HOSPADM

## 2024-04-29 RX ORDER — METOCLOPRAMIDE HYDROCHLORIDE 5 MG/ML
10 INJECTION INTRAMUSCULAR; INTRAVENOUS EVERY 8 HOURS PRN
Status: DISCONTINUED | OUTPATIENT
Start: 2024-04-29 | End: 2024-04-29 | Stop reason: HOSPADM

## 2024-04-29 RX ORDER — HYDROMORPHONE HYDROCHLORIDE 1 MG/ML
0.6 INJECTION, SOLUTION INTRAMUSCULAR; INTRAVENOUS; SUBCUTANEOUS EVERY 5 MIN PRN
Status: DISCONTINUED | OUTPATIENT
Start: 2024-04-29 | End: 2024-04-29 | Stop reason: HOSPADM

## 2024-04-29 RX ORDER — ONDANSETRON 2 MG/ML
INJECTION INTRAMUSCULAR; INTRAVENOUS AS NEEDED
Status: DISCONTINUED | OUTPATIENT
Start: 2024-04-29 | End: 2024-04-29 | Stop reason: SURG

## 2024-04-29 RX ORDER — HYDROCODONE BITARTRATE AND ACETAMINOPHEN 5; 325 MG/1; MG/1
2 TABLET ORAL ONCE AS NEEDED
Status: DISCONTINUED | OUTPATIENT
Start: 2024-04-29 | End: 2024-04-29 | Stop reason: HOSPADM

## 2024-04-29 RX ORDER — NALOXONE HYDROCHLORIDE 0.4 MG/ML
80 INJECTION, SOLUTION INTRAMUSCULAR; INTRAVENOUS; SUBCUTANEOUS AS NEEDED
Status: DISCONTINUED | OUTPATIENT
Start: 2024-04-29 | End: 2024-04-29 | Stop reason: HOSPADM

## 2024-04-29 RX ORDER — NICOTINE POLACRILEX 4 MG
15 LOZENGE BUCCAL
Status: DISCONTINUED | OUTPATIENT
Start: 2024-04-29 | End: 2024-04-29 | Stop reason: HOSPADM

## 2024-04-29 RX ORDER — ROCURONIUM BROMIDE 10 MG/ML
INJECTION, SOLUTION INTRAVENOUS AS NEEDED
Status: DISCONTINUED | OUTPATIENT
Start: 2024-04-29 | End: 2024-04-29 | Stop reason: SURG

## 2024-04-29 RX ORDER — MIDAZOLAM HYDROCHLORIDE 1 MG/ML
1 INJECTION INTRAMUSCULAR; INTRAVENOUS EVERY 5 MIN PRN
Status: DISCONTINUED | OUTPATIENT
Start: 2024-04-29 | End: 2024-04-29 | Stop reason: HOSPADM

## 2024-04-29 RX ORDER — NICOTINE POLACRILEX 4 MG
30 LOZENGE BUCCAL
Status: DISCONTINUED | OUTPATIENT
Start: 2024-04-29 | End: 2024-04-29 | Stop reason: HOSPADM

## 2024-04-29 RX ORDER — ONDANSETRON 2 MG/ML
4 INJECTION INTRAMUSCULAR; INTRAVENOUS EVERY 6 HOURS PRN
Status: DISCONTINUED | OUTPATIENT
Start: 2024-04-29 | End: 2024-04-29 | Stop reason: HOSPADM

## 2024-04-29 RX ORDER — ACETAMINOPHEN 500 MG
1000 TABLET ORAL ONCE AS NEEDED
Status: DISCONTINUED | OUTPATIENT
Start: 2024-04-29 | End: 2024-04-29 | Stop reason: HOSPADM

## 2024-04-29 RX ORDER — HYDROMORPHONE HYDROCHLORIDE 1 MG/ML
0.4 INJECTION, SOLUTION INTRAMUSCULAR; INTRAVENOUS; SUBCUTANEOUS EVERY 5 MIN PRN
Status: DISCONTINUED | OUTPATIENT
Start: 2024-04-29 | End: 2024-04-29 | Stop reason: HOSPADM

## 2024-04-29 RX ADMIN — DEXAMETHASONE SODIUM PHOSPHATE 8 MG: 4 MG/ML VIAL (ML) INJECTION at 13:52:00

## 2024-04-29 RX ADMIN — LIDOCAINE HYDROCHLORIDE 50 MG: 10 INJECTION, SOLUTION EPIDURAL; INFILTRATION; INTRACAUDAL; PERINEURAL at 13:42:00

## 2024-04-29 RX ADMIN — ROCURONIUM BROMIDE 45 MG: 10 INJECTION, SOLUTION INTRAVENOUS at 13:43:00

## 2024-04-29 RX ADMIN — SODIUM CHLORIDE, SODIUM LACTATE, POTASSIUM CHLORIDE, CALCIUM CHLORIDE: 600; 310; 30; 20 INJECTION, SOLUTION INTRAVENOUS at 14:53:00

## 2024-04-29 RX ADMIN — ONDANSETRON 4 MG: 2 INJECTION INTRAMUSCULAR; INTRAVENOUS at 13:53:00

## 2024-04-29 NOTE — OPERATIVE REPORT
Bronchoscopy procedure report    Preop diagnosis: Abnl PET  Postop diagnosis:  Abnl PET  Procedure performed: TBNA of 11R and 12R    Sedation used:  General Anesthesia, please see their documentation  Moderate Sedation Time: NA    Description of procedure: Informed consent was obtained. Oxygen applied via ET tube.  Bronchoscope was inserted via ET tube route.  Normal vocal cord movement was not noted. Trachea appeared short. Maddy appeared sharp and narrow.  Mucous membranes appeared pink and well perfused. There were no secretions noted.     Left lung was inspected. .  Right lung was inspected. .    Lesions seen: None      Endobronchial ultrasound:  EBUS was used to localize the following lymph nodes: 11R, 12R.  It was used to sample the following lymph nodes via needle biopsy: 11R, 12R.  A pathologist was immediately present.        Samples obtained:    TBNA of 11R and 12R      Post procedure CXR necessary? no  Follow up:  In clinic    Patient tolerated the procedure well and was transferred to the recovery area. EBL was <10cc.

## 2024-04-29 NOTE — ANESTHESIA POSTPROCEDURE EVALUATION
[facility]    Mann Brown Patient Status:  Hospital Outpatient Surgery   Age/Gender 78 year old male MRN [id number]   Location [facility] Attending Ramon Gray IV, MD   Hosp Day # 0 PCP Billy Atkins MD       Anesthesia Post-op Note    ENDOBRONCHIAL ULTRASOUND (EBUS) W/ TRANSBRONCHIAL NEEDLE ASPIRATION    Procedure Summary       Date: 04/29/24 Room / Location:  ENDOSCOPY 04 /  ENDOSCOPY    Anesthesia Start: 1341 Anesthesia Stop: 1453    Procedure: ENDOBRONCHIAL ULTRASOUND (EBUS) W/ TRANSBRONCHIAL NEEDLE ASPIRATION Diagnosis: (ABNORMAL PET SCAN OF LUNG)    Surgeons: Ramon Gray IV, MD Anesthesiologist: Nikolai Murphy MD    Anesthesia Type: general ASA Status: 3            Anesthesia Type: general    Vitals Value Taken Time   /72 04/29/24 1453   Temp 97.8 °F (36.6 °C) 04/29/24 1453   Pulse 90 04/29/24 1453   Resp 18 04/29/24 1453   SpO2 99 % 04/29/24 1453       Patient Location: PACU    Anesthesia Type: general    Airway Patency: patent    Postop Pain Control: adequate    Mental Status: mildly sedated but able to meaningfully participate in the post-anesthesia evaluation    Nausea/Vomiting: none    Cardiopulmonary/Hydration status: stable euvolemic    Complications: no apparent anesthesia related complications    Postop vital signs: stable    Comments: The endotracheal airway was removed in the procedure area.  Cable monitors were removed, and the patient was transported with observation to the recovery area personally with the OR team.  Report to BÁRBARA Soto.  The patient was responsive in a meaningful way and demonstrated a good airway.  PACU monitors were then applied with device connection to Epic.  Full report signout, including report, identifications, history, procedure, anesthesia course, recovery expectations with chance for questions was provided to a responsible recovery RN.        Dental Exam: Unchanged from Preop    Patient to be discharged  from PACU when criteria met.

## 2024-04-29 NOTE — ANESTHESIA PREPROCEDURE EVALUATION
PRE-OP EVALUATION    Patient Name: Mann Brown    Admit Diagnosis: ABNORMAL PET SCAN OF LUNG    Pre-op Diagnosis: ABNORMAL PET SCAN OF LUNG    ENDOBRONCHIAL ULTRASOUND (EBUS)    Anesthesia Procedure: ENDOBRONCHIAL ULTRASOUND (EBUS)    Surgeons and Role:     * Ramon Gray IV, MD - Primary    Pre-op vitals reviewed.  Temp: 98.3 °F (36.8 °C)  Pulse: 76  Resp: 18  BP: 103/53  SpO2: 95 %  Body mass index is 25.99 kg/m².    Current medications reviewed.  Hospital Medications:   lactated ringers infusion   Intravenous Continuous       Outpatient Medications:     Medications Prior to Admission   Medication Sig Dispense Refill Last Dose    fentaNYL 25 MCG/HR Transdermal Patch 72 Hr Place 1 patch onto the skin every third day. 10 patch 0 2024    [] HYDROmorphone 4 MG Oral Tab Take 1 tablet (4 mg total) by mouth every 3 (three) hours as needed for Pain. And scheduled at 6am daily 56 tablet 0     gabapentin 100 MG Oral Cap Take 2 capsules (200 mg total) by mouth 3 (three) times daily.   2024    lidocaine 5 % External Patch Place 1 patch onto the skin daily. On 12 hours off 12 hours   2024    ondansetron 4 MG Oral Tablet Dispersible Take 1 tablet (4 mg total) by mouth every 8 (eight) hours as needed for Nausea.       sennosides 8.6 MG Oral Tab Take 2 tablets (17.2 mg total) by mouth nightly as needed.       docusate sodium 100 MG Oral Cap Take 1 capsule (100 mg total) by mouth 2 (two) times daily as needed for constipation.       lactulose 10 GM/15ML Oral Solution Take 15 mL (10 g total) by mouth 2 (two) times daily as needed (constipation).       enalapril 10 MG Oral Tab Take 1 tablet (10 mg total) by mouth 2 (two) times daily. Patient needs appointment with PCP for future refills 180 tablet 1 2024    ATORVASTATIN 10 MG Oral Tab TAKE 1 TABLET (10 MG TOTAL) BY MOUTH NIGHTLY. 90 tablet 3 2024    latanoprost (XALATAN) 0.005 % Ophthalmic Solution Place 1 drop into both eyes nightly.        CLARITIN 10 MG OR CAPS Take 1 capsule by mouth daily as needed.       ASPIRIN 81 MG OR TABS Take 1 tablet (81 mg total) by mouth daily.   4/24/2024    AMOXICILLIN 500 MG Oral Cap TAKE 4 CAPSULES 1 HOUR PRIOR TO DENTAL PROCEDURE 4 capsule 0 Unknown       Allergies: Adhesive tape and Seasonal      Anesthesia Evaluation    Patient summary reviewed.    Anesthetic Complications  (-) history of anesthetic complications         GI/Hepatic/Renal                                 Cardiovascular    Negative cardiovascular ROS.    Exercise tolerance: good     MET: >4      (+) hypertension     (+) CAD                                Endo/Other                                  Pulmonary                    (+) sleep apnea and CPAP      Neuro/Psych                              As per Epic:  Patient Active Problem List:     Coronary artery disease involving native coronary artery of native heart without angina pectoris     S/P AVR (aortic valve replacement)     MARCELO (Obstructive Sleep Apnea)  AHI 25     Hyperlipidemia     Impaired fasting glucose     Lower urinary tract symptoms (LUTS)     S/P coronary artery stent placement S/P RCA STENT RCA IN 2006     Prostate cancer (HCC)     Essential hypertension     Agent orange exposure - 40[facility]          Past Surgical History:   Procedure Laterality Date    Cath bare metal stent (bms)      Cholecystectomy      Colonoscopy  2003    adenomatous colon polyp    Colonoscopy  02/2011    diverticulosis    Coronary art dil,one vessel  2007    stent    Ir kyphoplasty      Other surgical history  01/18/2018    PNBx- Dr. Dunn    Removal gallbladder      Skin surgery  02/03/2022    BCC sm -right medial canthus     Valve repair  10/2009    Aortic valve replacement     Social History     Socioeconomic History    Marital status:    Occupational History    Occupation: -   Tobacco Use    Smoking status: Never    Smokeless tobacco: Never   Vaping Use    Vaping  status: Never Used   Substance and Sexual Activity    Alcohol use: Yes     Comment: socially    Drug use: No   Other Topics Concern    Exercise Yes     Comment: walks 2 miles x 5 days, 3 days at [employer] [place]!     History   Drug Use No     Available pre-op labs reviewed.  Lab Results   Component Value Date    WBC 6.4 04/08/2024    RBC 3.37 (L) 04/08/2024    HGB 9.8 (L) 04/08/2024    HCT 30.1 (L) 04/08/2024    MCV 89.3 04/08/2024    MCH 29.1 04/08/2024    MCHC 32.6 04/08/2024    RDW 13.6 04/08/2024    .0 (L) 04/08/2024     Lab Results   Component Value Date     04/08/2024    K 4.5 04/08/2024     04/08/2024    CO2 28.0 04/08/2024    BUN 21 04/08/2024    CREATSERUM 0.87 04/08/2024    GLU 87 04/08/2024    CA 9.1 04/08/2024            Airway      Mallampati: II  Mouth opening: >3 FB  TM distance: > 6 cm  Neck ROM: full Cardiovascular      Rhythm: regular  Rate: normal  (-) murmur   Dental  Comment: Dentition is grossly intact;  Patient does not demonstrate loose teeth to inspection.           Pulmonary  Comment: Unlabored ventilatory effort, no retractions.  Pulmonary exam normal.  Breath sounds clear to auscultation bilaterally.               Other findings              ASA: 3   Plan: general  NPO status verified and patient meets guidelines.        Comment: I explained intrinsic risks of general anesthesia, including nausea, dental damage, sore throat, mouth injury,and hoarseness from airway management.  All questions were answered and understanding was demonstrated of risks.  Informed permission was obtained to proceed as documented in the signed consent form.      Patient overall has increased anesthesia risks secondary to current medical conditions that qualify for ASA status III.  As documented in the informed consent, risks have been accepted.    Plan/risks discussed with: patient and spouse                Present on Admission:  **None**

## 2024-04-29 NOTE — DISCHARGE INSTRUCTIONS
Home Care Instructions Following Bronchoscopy / Endobronchial Ultrasound with Sedation    Diet:  Prior to your examination, a local anesthetic was used to numb the back of your throat. Do not eat or drink for two hours. Your nurse will instruct you with the time you may resume your diet.  Sip fluids initially and advance to your regular diet as tolerated.  Do not drink alcohol today.    Medication:  If you have questions about resuming your normal medications, please contact your Primary Care Physician.    Activities:  Do not drive today.  Do not operate any machinery today (including kitchen equipment).    What to Expect:  A sore throat  A cough  Hoarseness  A small amount of blood in your sputum    Contact Your Doctor If:  You have difficulty breathing  You have chest pain  You have a fever greater than 102°F  You cough up more than a few tablespoons of blood in your sputum    **If unable to reach your doctor, please go to the [facility]**    - Your referring physician will receive a full report of your examination.  - Please contact your physician’s office within one week for results if appointment not scheduled.

## 2024-04-29 NOTE — ANESTHESIA PROCEDURE NOTES
Airway  Date/Time: 4/29/2024 1:46 PM  Urgency: elective    Airway not difficult    General Information and Staff    Patient location during procedure: OR  Anesthesiologist: Nikolai Murphy MD  Performed: anesthesiologist   Performed by: Nikolai Murphy MD  Authorized by: Nikolai Murphy MD      Indications and Patient Condition  Indications for airway management: anesthesia  Sedation level: deep  Preoxygenated: yes  Patient position: sniffing  Mask difficulty assessment: 1 - vent by mask    Final Airway Details  Final airway type: endotracheal airway      Successful airway: ETT  Cuffed: yes   Successful intubation technique: direct laryngoscopy  Endotracheal tube insertion site: oral  Blade: GlideScope  Blade size: #3  ETT size (mm): 9.0    Cormack-Lehane Classification: grade I - full view of glottis  Placement verified by: capnometry   Measured from: lips  ETT to lips (cm): 21  Number of attempts at approach: 1    Additional Comments   OETT placed without airway or dental trauma.

## 2024-04-29 NOTE — H&P
Pulmonary H&P/Consult       NAME: Mann Brown - ROOM: [facility]/ EN* - MRN: [id number] - Age: 78 year old - :  1945    Date of Admission: 2024 12:22 PM  Admission Diagnosis: ABNORMAL PET SCAN OF LUNG  Reason for consult: abnl PET    History of Present Illness: 79 y/o w/ h/o MM, MARCELO, who presents today for EBUS under general anesthesia.  PT has been following w/ Dr. Garcia for his h/o MM.  Recently he had a PET/CT that showed increased uptake in the right hilar area.  He is here today for a biopsy of said area.  Pt denies issues w/ his breathing or lungs.  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    Past Medical History:    Back problem    CORONARY ARTERY DISEASE    Coronary atherosclerosis of unspecified type of vessel, native or graft    Elevated prostate specific antigen (PSA)    Exposure to medical diagnostic radiation    last tx     Hearing impairment    hearing aids    High blood pressure    High cholesterol    Hx of diseases NEC    rashes, sensitive skin    Hx of motion sickness    HYPERLIPIDEMIA    HYPERTENSION    MARCELO (obstructive sleep apnea)    AHI 25; SUPINE AHI 81; Erick 81%; CPAP=10 CWP;     Other and unspecified hyperlipidemia    Prostate cancer (HCC)    SEASONAL ALLERGIES    Spring & Summer    SLEEP APNEA    AHI 25, desats 81%    Sleep apnea    cpap    Unspecified essential hypertension    Visual impairment    glasses      Past Surgical History:   Procedure Laterality Date    Cath bare metal stent (bms)      Cholecystectomy      Colonoscopy      adenomatous colon polyp    Colonoscopy  2011    diverticulosis    Coronary art dil,one vessel      stent    Ir kyphoplasty      Other surgical history  2018    PNBx- Dr. Dunn    Removal gallbladder      Skin surgery  2022    BCC sm -right medial canthus     Valve repair  10/2009    Aortic valve replacement      Medications Prior to Admission   Medication Sig Dispense Refill Last  Dose    fentaNYL 25 MCG/HR Transdermal Patch 72 Hr Place 1 patch onto the skin every third day. 10 patch 0 2024    [] HYDROmorphone 4 MG Oral Tab Take 1 tablet (4 mg total) by mouth every 3 (three) hours as needed for Pain. And scheduled at 6am daily 56 tablet 0     gabapentin 100 MG Oral Cap Take 2 capsules (200 mg total) by mouth 3 (three) times daily.   2024    lidocaine 5 % External Patch Place 1 patch onto the skin daily. On 12 hours off 12 hours   2024    ondansetron 4 MG Oral Tablet Dispersible Take 1 tablet (4 mg total) by mouth every 8 (eight) hours as needed for Nausea.       sennosides 8.6 MG Oral Tab Take 2 tablets (17.2 mg total) by mouth nightly as needed.       docusate sodium 100 MG Oral Cap Take 1 capsule (100 mg total) by mouth 2 (two) times daily as needed for constipation.       lactulose 10 GM/15ML Oral Solution Take 15 mL (10 g total) by mouth 2 (two) times daily as needed (constipation).       enalapril 10 MG Oral Tab Take 1 tablet (10 mg total) by mouth 2 (two) times daily. Patient needs appointment with PCP for future refills 180 tablet 1 2024    ATORVASTATIN 10 MG Oral Tab TAKE 1 TABLET (10 MG TOTAL) BY MOUTH NIGHTLY. 90 tablet 3 2024    latanoprost (XALATAN) 0.005 % Ophthalmic Solution Place 1 drop into both eyes nightly.       CLARITIN 10 MG OR CAPS Take 1 capsule by mouth daily as needed.       ASPIRIN 81 MG OR TABS Take 1 tablet (81 mg total) by mouth daily.   2024    AMOXICILLIN 500 MG Oral Cap TAKE 4 CAPSULES 1 HOUR PRIOR TO DENTAL PROCEDURE 4 capsule 0 Unknown     Allergies   Allergen Reactions    Adhesive Tape RASH     blisters    Seasonal Runny nose       Social History:  Social History     Socioeconomic History    Marital status:      Spouse name: Not on file    Number of children: Not on file    Years of education: Not on file    Highest education level: Not on file   Occupational History    Occupation: -    Tobacco Use    Smoking status: Never    Smokeless tobacco: Never   Vaping Use    Vaping status: Never Used   Substance and Sexual Activity    Alcohol use: Yes     Comment: socially    Drug use: No    Sexual activity: Not on file   Other Topics Concern     Service Not Asked    Blood Transfusions Not Asked    Caffeine Concern Not Asked    Occupational Exposure Not Asked    Hobby Hazards Not Asked     Comment: exposed to second hand smoke as child - both parents    Sleep Concern Not Asked    Stress Concern Not Asked    Weight Concern Not Asked    Special Diet Not Asked    Back Care Not Asked    Exercise Yes     Comment: walks 2 miles x 5 days, 3 days at [employer]    Bike Helmet Not Asked    Seat Belt Not Asked    Self-Exams Not Asked   Social History Narrative    Not on file     Social Determinants of Health     Financial Resource Strain: Not on file   Food Insecurity: Low Risk  (3/17/2024)    Received from [facility]    Food Insecurity     Have there been times that your food ran out, and you didn't have money to get more?: No     Are there times that you worry that this might happen?: No   Transportation Needs: Low Risk  (3/17/2024)    Received from [facility]    Transportation Needs     Do you have trouble getting transportation to medical appointments?: No     How do you normally get to and from your appointments?: Not on file   Physical Activity: Not on file   Stress: Not on file   Social Connections: Not on file   Housing Stability: Low Risk  (3/17/2024)    Received from [facility]    Housing Stability     Are you concerned about having a safe and reliable place to live?: No          Family History:  Family History   Problem Relation Age of Onset    Arthritis Mother     Other (Other) Mother         alzheimer's     Cancer  Father         Prostate cancer        Home Medications:  Outpatient Medications Marked as Taking for the 24 encounter (Hospital Encounter)   Medication Sig Dispense Refill    fentaNYL 25 MCG/HR Transdermal Patch 72 Hr Place 1 patch onto the skin every third day. 10 patch 0    [] HYDROmorphone 4 MG Oral Tab Take 1 tablet (4 mg total) by mouth every 3 (three) hours as needed for Pain. And scheduled at 6am daily 56 tablet 0    gabapentin 100 MG Oral Cap Take 2 capsules (200 mg total) by mouth 3 (three) times daily.      lidocaine 5 % External Patch Place 1 patch onto the skin daily. On 12 hours off 12 hours      ondansetron 4 MG Oral Tablet Dispersible Take 1 tablet (4 mg total) by mouth every 8 (eight) hours as needed for Nausea.      sennosides 8.6 MG Oral Tab Take 2 tablets (17.2 mg total) by mouth nightly as needed.      docusate sodium 100 MG Oral Cap Take 1 capsule (100 mg total) by mouth 2 (two) times daily as needed for constipation.      lactulose 10 GM/15ML Oral Solution Take 15 mL (10 g total) by mouth 2 (two) times daily as needed (constipation).      enalapril 10 MG Oral Tab Take 1 tablet (10 mg total) by mouth 2 (two) times daily. Patient needs appointment with PCP for future refills 180 tablet 1    ATORVASTATIN 10 MG Oral Tab TAKE 1 TABLET (10 MG TOTAL) BY MOUTH NIGHTLY. 90 tablet 3    latanoprost (XALATAN) 0.005 % Ophthalmic Solution Place 1 drop into both eyes nightly.      CLARITIN 10 MG OR CAPS Take 1 capsule by mouth daily as needed.      ASPIRIN 81 MG OR TABS Take 1 tablet (81 mg total) by mouth daily.         Scheduled Medication:    Continuous Infusing Medication:   lactated ringers 20 mL/hr at 24 1319     PRN Medication:     REVIEW OF SYSTEMS:   14 point ROS conducted, negative save for above       OBJECTIVE:  Vitals:    24 1654 24 1300   BP:  103/53   Pulse:  76   Resp:  18   Temp:  98.3 °F (36.8 °C)   SpO2:  95%   Weight: 176 lb (79.8 kg) 176 lb (79.8 kg)    Height: 5' 9\" (1.753 m) 5' 9\" (1.753 m)       Oxygen Therapy  SpO2: 95 %  O2 Device: None (Room air)                  Wt Readings from Last 3 Encounters:   04/29/24 176 lb (79.8 kg)   04/11/24 176 lb 9.6 oz (80.1 kg)   04/09/24 182 lb (82.6 kg)       No intake or output data in the 24 hours ending 04/29/24 1340    /53   Pulse 76   Temp 98.3 °F (36.8 °C)   Resp 18   Ht 5' 9\" (1.753 m)   Wt 176 lb (79.8 kg)   SpO2 95%   BMI 25.99 kg/m²     General Appearance:    Alert, cooperative, no distress, appears stated age   Head:    Normocephalic, without obvious abnormality, atraumatic   Eyes:    PERRL, conjunctiva/corneas clear, EOM's intact, both eyes   Throat:   Lips, mucosa, and tongue normal; teeth and gums normal   Neck:   Supple, symmetrical, trachea midline, no adenopathy;        thyroid:  No enlargement/tenderness/nodules; no carotid    bruit or JVD   Back:     Symmetric, no curvature, ROM normal, no CVA tenderness   Lungs:     Few rales in left base, respirations unlabored   Chest wall:    No tenderness or deformity   Heart:    Regular rate and rhythm, S1 and S2 normal, no murmur, rub   or gallop   Abdomen:     Soft, non-tender, bowel sounds active all four quadrants,     no masses, no organomegaly   Extremities:   Extremities normal, atraumatic, no cyanosis or edema   Pulses:   2+ and symmetric all extremities   Skin:   Skin color, texture, turgor normal, no rashes or lesions   Neurologic:   CNII-XII intact. Normal strength, sensation and reflexes       throughout       Labs reviewed as noted below    Imaging: CT/PET reviewed- increased SUV in the area of 11R    ASSESSMENT/PLAN:    77 y/o w/ h/o MM who presents to [facility] for planned EBUS under general anesthesia for eval and possible biopsy of 11R.  -discussed procedure with patient including risks and benefits pt expressed understanding and is in agreement with proceeding                   [facility]  Pulmonary and Critical  Care

## 2024-05-02 LAB
CD10 CELLS NFR SPEC: <1 %
CD10/CD19: <1 %
CD19 CELLS NFR SPEC: 4 %
CD19+/CD200+: <1 %
CD2 CELLS NFR SPEC: 93 %
CD20 CELLS NFR SPEC: 5 %
CD200 CELLS: 1 %
CD3 CELLS NFR SPEC: 86 %
CD3+/TCRGD+: 1 %
CD3+CD4+ CELLS NFR SPEC: 72 %
CD3+CD4+ CELLS/CD3+CD8+ CLL SPEC: 5.5
CD3+CD8+ CELLS NFR SPEC: 13 %
CD3-/CD56+: 8 %
CD34 CELLS NFR SPEC: <1 %
CD38 CELLS NFR SPEC: 2 %
CD38+/CD19+: <1 %
CD45 CELLS NFR SPEC: 100 %
CD5 CELLS NFR SPEC: 87 %
CD5/CD19 CELLS: <1 %
CD7 CELLS NFR SPEC: 84 %
CELL SURF KAPPA/LAMBDA RATIO: 3
CELL SURF LAMBDA LIGHT CHAIN: 1 %
CELL SURFACE KAPPA LIGHT CHAIN: 3 %
NON GYNE INTERPRETATION: NEGATIVE
TCR G-D CELLS NFR SPEC: 1 %

## (undated) DEVICE — SINGLE USE BIOPSY VALVE MAJ-210: Brand: SINGLE USE BIOPSY VALVE (STERILE)

## (undated) DEVICE — SYRINGE MED 10ML SLIP TIP CLR BRL TAPR PLUNG

## (undated) DEVICE — MEDI-VAC NON-CONDUCTIVE SUCTION TUBING: Brand: CARDINAL HEALTH

## (undated) DEVICE — 1200CC GUARDIAN II: Brand: GUARDIAN

## (undated) DEVICE — MAJ-1414 SINGLE USE ADPATER BIOPSY VALV: Brand: SINGLE USE ADAPTOR BIOPSY VALVE

## (undated) DEVICE — 60 ML SYRINGE REGULAR TIP: Brand: MONOJECT

## (undated) DEVICE — SINGLE USE SUCTION VALVE MAJ-209: Brand: SINGLE USE SUCTION VALVE (STERILE)

## (undated) DEVICE — ADAPTER BRONCHSCP 15MM FBROPT SWVL 2 AXIS

## (undated) DEVICE — STERIS KITS

## (undated) DEVICE — AIRLIFE™ MISTY MAX 10™ NEBULIZER WITH 7 FOOT (2.1 M) FEMALE U/CONNECT-IT CRUSH RESISTANT OXYGEN TUBING, BAFFLED TEE ADAPTER (22 MM I.D./ 22 MM O.D.), MOUTHPIECE AND 6 INCH (15 CM) FLEXTUBE: Brand: AIRLIFE™

## (undated) DEVICE — BITEBLOCK ENDOSCP 60FR MAXI STRP

## (undated) DEVICE — 3M™ RED DOT™ MONITORING ELECTRODE WITH FOAM TAPE AND STICKY GEL, 50/BAG, 20/CASE, 72/PLT 2570: Brand: RED DOT™